# Patient Record
Sex: FEMALE | Race: WHITE | NOT HISPANIC OR LATINO
[De-identification: names, ages, dates, MRNs, and addresses within clinical notes are randomized per-mention and may not be internally consistent; named-entity substitution may affect disease eponyms.]

---

## 2023-06-23 PROBLEM — Z00.00 ENCOUNTER FOR PREVENTIVE HEALTH EXAMINATION: Status: ACTIVE | Noted: 2023-06-23

## 2023-06-30 ENCOUNTER — APPOINTMENT (OUTPATIENT)
Dept: GYNECOLOGIC ONCOLOGY | Facility: CLINIC | Age: 58
End: 2023-06-30
Payer: COMMERCIAL

## 2023-06-30 VITALS
DIASTOLIC BLOOD PRESSURE: 87 MMHG | RESPIRATION RATE: 18 BRPM | BODY MASS INDEX: 34.93 KG/M2 | WEIGHT: 185 LBS | HEIGHT: 61 IN | HEART RATE: 81 BPM | OXYGEN SATURATION: 97 % | SYSTOLIC BLOOD PRESSURE: 149 MMHG

## 2023-06-30 PROCEDURE — 99205 OFFICE O/P NEW HI 60 MIN: CPT

## 2023-06-30 NOTE — HISTORY OF PRESENT ILLNESS
[FreeTextEntry1] :  58yo P4  LMP in early 50's  referred for right ovarian simple cyst, followed for several years with increase in size from 3cm in 2014 to 10cm in 2021. Pt with pelvic pain that was initially worked up by urology and pelvic sono performed. Pain is now believed to be from back pain and pt referred to ortho. Pt was recently seen by Dr Garrett and now presents for second opinion.   \par denies n/v/fever/bleeding/bloating. Reports normal urination and BMs. \par \par PMHx: lung nodule, left renal stone, COPD, pre DM\par PSHx: open cystectomy 2002, right breast bx\par OBGYNHx: hx of ovarian cyst, denies hx of fibroids/abn paps/stis\par FamHx: gyn ca, breast ca, colon ca\par SocHx: former smoker quit aprox 11mths ago\par \par mammogram: June 2023 WNL per pt report\par colonoscopy: polyps removed 7yrs ago, benign, pt due for f/u\par \par Labs:\par pap 5/25/23: NILM, HPVHR not detected\par \par 5/24/23:\par Ca 125 = 7\par CEA = 1.7\par CA 19-9 = 35\par Inhibin A = 1.0\par Inhibin B = <10\par Testosterone, total = 79, free = 11.9\par Estrogen = 183.8\par \par \par Imaging:\par Pelvic sono 6/8/23: \par Findings:\par The uterus measures 8.8 x 2.4 x 3.6 cm.  Endometrial stripe measures 2 mm.  No fibroids.\par The right ovary measures 4.3 x 6.9 x 11.1 cm.  The right ovary demonstrates a large cyst measuring up to 11.9 cm.  Normal vascular flow noted in the right ovary.\par The left ovary measures 2.4 x 1.8 x 1.6 cm\par No ascites\par Impression:\par 11.9 cm simple cyst in the right ovary, essentially unchanged since the comparison study when the cyst was measuring 10.3 cm\par \par \par

## 2023-06-30 NOTE — DISCUSSION/SUMMARY
[Reviewed Clinical Lab Test(s)] : Results of clinical tests were reviewed. [Reviewed Radiology Report(s)] : Radiology reports were reviewed. [Discuss Alternatives/Risks/Benefits w/Patient] : All alternatives, risks, and benefits were discussed with the patient/family and all questions were answered.  Patient expressed good understanding and appreciates the importance of follow up as recommended. [Visit Time ___ Minutes] : [unfilled] minutes [Face to Face Time___ Minutes] : with [unfilled] minutes in face to face consultation. [FreeTextEntry1] : 56yo w/ large simple right ovarian cyst, elevated testosterone, mild elevation in ca19-9, for further evaluation and treatmetn planning.\par -more than 50% of visit spent face to face with patient reviewing records and interpreting imaging/path/lab results, counseling and coordinating care\par - I reviewed benign vs malignant etiology of ovarian cysts. I explained that in her case cyst appears simple on imaging but given size recommendation is for surgical intervention. Patient has been seen by Dr. Garrett in consultation who also recommended surgery but she is reluctant to pursue surgery at this time. I explained to patient my impression for cancer here is low however only way for 100% definitive diagnosis is with pathologic evaluation and that requires surgical removal of ovary with cyst. all questions answered and patient expressed understanding\par -pt declines surgery at this time. will trend tumor markers that were slightly elevated (ca19-9 and testosterone). will call pt with results of lab work\par -rpt pelvic sono in 3 months\par -rtc 9/2023 for f/u , results review and finalize treatment plan\par -pain/fever/bleeding precautions given\par

## 2023-07-13 ENCOUNTER — NON-APPOINTMENT (OUTPATIENT)
Age: 58
End: 2023-07-13

## 2023-08-01 ENCOUNTER — APPOINTMENT (OUTPATIENT)
Dept: GYNECOLOGIC ONCOLOGY | Facility: CLINIC | Age: 58
End: 2023-08-01

## 2023-09-06 ENCOUNTER — APPOINTMENT (OUTPATIENT)
Dept: GYNECOLOGIC ONCOLOGY | Facility: CLINIC | Age: 58
End: 2023-09-06
Payer: COMMERCIAL

## 2023-09-06 PROCEDURE — 99214 OFFICE O/P EST MOD 30 MIN: CPT

## 2023-10-16 ENCOUNTER — TRANSCRIPTION ENCOUNTER (OUTPATIENT)
Age: 58
End: 2023-10-16

## 2023-10-16 ENCOUNTER — RESULT REVIEW (OUTPATIENT)
Age: 58
End: 2023-10-16

## 2023-10-16 ENCOUNTER — APPOINTMENT (OUTPATIENT)
Dept: GYNECOLOGIC ONCOLOGY | Facility: HOSPITAL | Age: 58
End: 2023-10-16

## 2023-10-16 DIAGNOSIS — G89.18 OTHER ACUTE POSTPROCEDURAL PAIN: ICD-10-CM

## 2023-10-16 RX ORDER — TRAMADOL HYDROCHLORIDE 50 MG/1
50 TABLET, COATED ORAL
Qty: 6 | Refills: 0 | Status: ACTIVE | COMMUNITY
Start: 2023-10-16 | End: 1900-01-01

## 2023-11-08 ENCOUNTER — APPOINTMENT (OUTPATIENT)
Dept: GYNECOLOGIC ONCOLOGY | Facility: CLINIC | Age: 58
End: 2023-11-08
Payer: COMMERCIAL

## 2023-11-08 VITALS
DIASTOLIC BLOOD PRESSURE: 79 MMHG | HEART RATE: 72 BPM | OXYGEN SATURATION: 97 % | SYSTOLIC BLOOD PRESSURE: 130 MMHG | TEMPERATURE: 98.1 F

## 2023-11-08 PROCEDURE — 99024 POSTOP FOLLOW-UP VISIT: CPT

## 2024-03-06 ENCOUNTER — APPOINTMENT (OUTPATIENT)
Dept: GYNECOLOGIC ONCOLOGY | Facility: CLINIC | Age: 59
End: 2024-03-06
Payer: COMMERCIAL

## 2024-03-06 DIAGNOSIS — N83.201 UNSPECIFIED OVARIAN CYST, RIGHT SIDE: ICD-10-CM

## 2024-03-06 DIAGNOSIS — R79.89 OTHER SPECIFIED ABNORMAL FINDINGS OF BLOOD CHEMISTRY: ICD-10-CM

## 2024-03-06 PROCEDURE — 99213 OFFICE O/P EST LOW 20 MIN: CPT
